# Patient Record
Sex: FEMALE | Race: WHITE | NOT HISPANIC OR LATINO | URBAN - METROPOLITAN AREA
[De-identification: names, ages, dates, MRNs, and addresses within clinical notes are randomized per-mention and may not be internally consistent; named-entity substitution may affect disease eponyms.]

---

## 2024-02-29 ENCOUNTER — APPOINTMENT (RX ONLY)
Dept: URBAN - METROPOLITAN AREA CLINIC 46 | Facility: CLINIC | Age: 54
Setting detail: DERMATOLOGY
End: 2024-02-29

## 2024-02-29 PROBLEM — D23.21 OTHER BENIGN NEOPLASM OF SKIN OF RIGHT EAR AND EXTERNAL AURICULAR CANAL: Status: ACTIVE | Noted: 2024-02-29

## 2024-02-29 PROCEDURE — ? ADDITIONAL NOTES

## 2024-02-29 PROCEDURE — ? DEFER

## 2024-02-29 PROCEDURE — 99202 OFFICE O/P NEW SF 15 MIN: CPT

## 2024-02-29 PROCEDURE — ? PHOTO-DOCUMENTATION

## 2024-02-29 PROCEDURE — ? COUNSELING

## 2024-02-29 NOTE — PROCEDURE: ADDITIONAL NOTES
Render Risk Assessment In Note?: no
Additional Notes: Pt states a lesion popped up, started out somewhat dark in color, then got smaller and lighter in color. The lesion was never tender, itchy and never bled. She cannot recall any trauma.\\n\\nDiscussed biopsy vs watching over time. Pt deferred biopsy today. Per history and exam today, it does not appear to be a skin cancer and appears to be healing. Advised pt to continue monitoring changes in size, color, shape and bleeding of skin lesions. RTC should those things occur.\\n\\nPt also inquired about red light therapy - she has a home unit she is using for her back. Discussed there are different version of light units that may or may not be effective, but they are typically not harmful.\\n\\nRTC for biopsy if sx of skin lesion changes or is not gone within 6 weeks.
Detail Level: Simple

## 2024-02-29 NOTE — HPI: SKIN LESION
What Type Of Note Output Would You Prefer (Optional)?: Bullet Format
How Severe Is Your Skin Lesion?: mild
Has Your Skin Lesion Been Treated?: not been treated
Is This A New Presentation, Or A Follow-Up?: Skin Lesion
Additional History: Pt complains of asymptomatic skin lesion on R ear x weeks. She states skin lesion was dark when it first appeared then lightened recently. Denies previous trauma to the area. No hx of skin cancers reported.